# Patient Record
Sex: FEMALE | Race: WHITE | Employment: UNEMPLOYED | URBAN - METROPOLITAN AREA
[De-identification: names, ages, dates, MRNs, and addresses within clinical notes are randomized per-mention and may not be internally consistent; named-entity substitution may affect disease eponyms.]

---

## 2018-04-14 PROCEDURE — 86803 HEPATITIS C AB TEST: CPT | Performed by: FAMILY MEDICINE

## 2018-06-13 PROCEDURE — 88175 CYTOPATH C/V AUTO FLUID REDO: CPT | Performed by: OBSTETRICS & GYNECOLOGY

## 2018-06-13 PROCEDURE — 87624 HPV HI-RISK TYP POOLED RSLT: CPT | Performed by: OBSTETRICS & GYNECOLOGY

## 2019-03-22 PROBLEM — F41.9 ANXIETY: Status: ACTIVE | Noted: 2019-03-22

## 2019-03-22 PROBLEM — H93.13 TINNITUS OF BOTH EARS: Status: ACTIVE | Noted: 2019-03-22

## 2020-02-10 PROBLEM — S42.292A OTHER CLOSED DISPLACED FRACTURE OF PROXIMAL END OF LEFT HUMERUS, INITIAL ENCOUNTER: Status: ACTIVE | Noted: 2020-02-10

## 2020-02-11 ENCOUNTER — ANESTHESIA EVENT (OUTPATIENT)
Dept: SURGERY | Facility: HOSPITAL | Age: 55
End: 2020-02-11
Payer: COMMERCIAL

## 2020-02-11 ENCOUNTER — ANESTHESIA (OUTPATIENT)
Dept: SURGERY | Facility: HOSPITAL | Age: 55
End: 2020-02-11
Payer: COMMERCIAL

## 2020-02-11 ENCOUNTER — APPOINTMENT (OUTPATIENT)
Dept: GENERAL RADIOLOGY | Facility: HOSPITAL | Age: 55
End: 2020-02-11
Attending: ORTHOPAEDIC SURGERY
Payer: COMMERCIAL

## 2020-02-11 ENCOUNTER — HOSPITAL ENCOUNTER (OUTPATIENT)
Facility: HOSPITAL | Age: 55
Setting detail: HOSPITAL OUTPATIENT SURGERY
Discharge: HOME | End: 2020-02-11
Attending: ORTHOPAEDIC SURGERY | Admitting: ORTHOPAEDIC SURGERY
Payer: COMMERCIAL

## 2020-02-11 VITALS
HEART RATE: 97 BPM | SYSTOLIC BLOOD PRESSURE: 132 MMHG | OXYGEN SATURATION: 95 % | HEIGHT: 70 IN | WEIGHT: 190 LBS | BODY MASS INDEX: 27.2 KG/M2 | RESPIRATION RATE: 18 BRPM | DIASTOLIC BLOOD PRESSURE: 77 MMHG | TEMPERATURE: 97 F

## 2020-02-11 DIAGNOSIS — M25.512 ACUTE PAIN OF LEFT SHOULDER: ICD-10-CM

## 2020-02-11 PROCEDURE — 0RHK04Z INSERTION OF INTERNAL FIXATION DEVICE INTO LEFT SHOULDER JOINT, OPEN APPROACH: ICD-10-PCS | Performed by: ORTHOPAEDIC SURGERY

## 2020-02-11 PROCEDURE — 76000 FLUOROSCOPY <1 HR PHYS/QHP: CPT | Performed by: ORTHOPAEDIC SURGERY

## 2020-02-11 PROCEDURE — 64415 NJX AA&/STRD BRCH PLXS IMG: CPT | Performed by: ORTHOPAEDIC SURGERY

## 2020-02-11 PROCEDURE — 76942 ECHO GUIDE FOR BIOPSY: CPT | Performed by: ORTHOPAEDIC SURGERY

## 2020-02-11 PROCEDURE — 0LS20ZZ REPOSITION LEFT SHOULDER TENDON, OPEN APPROACH: ICD-10-PCS | Performed by: ORTHOPAEDIC SURGERY

## 2020-02-11 PROCEDURE — 99152 MOD SED SAME PHYS/QHP 5/>YRS: CPT | Performed by: ORTHOPAEDIC SURGERY

## 2020-02-11 PROCEDURE — 0PSD04Z REPOSITION LEFT HUMERAL HEAD WITH INTERNAL FIXATION DEVICE, OPEN APPROACH: ICD-10-PCS | Performed by: ORTHOPAEDIC SURGERY

## 2020-02-11 PROCEDURE — 3E0T3BZ INTRODUCTION OF ANESTHETIC AGENT INTO PERIPHERAL NERVES AND PLEXI, PERCUTANEOUS APPROACH: ICD-10-PCS | Performed by: ANESTHESIOLOGY

## 2020-02-11 DEVICE — SCREW CORT LOCK PURPLE 3.5X46: Type: IMPLANTABLE DEVICE | Status: FUNCTIONAL

## 2020-02-11 DEVICE — SCREW CORT LOCK PURPLE 3.5X38
Type: IMPLANTABLE DEVICE | Status: NON-FUNCTIONAL
Removed: 2020-06-08

## 2020-02-11 DEVICE — SCREW CORT LOCK PURPLE 3.5X28: Type: IMPLANTABLE DEVICE | Status: FUNCTIONAL

## 2020-02-11 DEVICE — SCREW CORT LOCK PURPLE 3.5X36: Type: IMPLANTABLE DEVICE | Status: FUNCTIONAL

## 2020-02-11 DEVICE — SCREW CORT LOCK PURPLE 3.5X26: Type: IMPLANTABLE DEVICE | Status: FUNCTIONAL

## 2020-02-11 DEVICE — SCREW CORT LOCK PURPLE 3.5X42: Type: IMPLANTABLE DEVICE | Status: FUNCTIONAL

## 2020-02-11 DEVICE — IMPLANTABLE DEVICE: Type: IMPLANTABLE DEVICE | Status: FUNCTIONAL

## 2020-02-11 DEVICE — SCREW CORT LOCK PURPLE 3.5X44
Type: IMPLANTABLE DEVICE | Status: NON-FUNCTIONAL
Removed: 2020-06-08

## 2020-02-11 DEVICE — SCREW CORT LOCK PURPLE 3.5X34: Type: IMPLANTABLE DEVICE | Status: FUNCTIONAL

## 2020-02-11 RX ORDER — NALOXONE HYDROCHLORIDE 0.4 MG/ML
80 INJECTION, SOLUTION INTRAMUSCULAR; INTRAVENOUS; SUBCUTANEOUS AS NEEDED
Status: DISCONTINUED | OUTPATIENT
Start: 2020-02-11 | End: 2020-02-11

## 2020-02-11 RX ORDER — MIDAZOLAM HYDROCHLORIDE 1 MG/ML
INJECTION INTRAMUSCULAR; INTRAVENOUS
Status: DISCONTINUED | OUTPATIENT
Start: 2020-02-11 | End: 2020-02-11

## 2020-02-11 RX ORDER — MORPHINE SULFATE 4 MG/ML
2 INJECTION, SOLUTION INTRAMUSCULAR; INTRAVENOUS EVERY 10 MIN PRN
Status: DISCONTINUED | OUTPATIENT
Start: 2020-02-11 | End: 2020-02-11

## 2020-02-11 RX ORDER — ONDANSETRON 2 MG/ML
4 INJECTION INTRAMUSCULAR; INTRAVENOUS EVERY 6 HOURS PRN
Status: DISCONTINUED | OUTPATIENT
Start: 2020-02-11 | End: 2020-02-11

## 2020-02-11 RX ORDER — HYDROCODONE BITARTRATE AND ACETAMINOPHEN 5; 325 MG/1; MG/1
1 TABLET ORAL AS NEEDED
Status: DISCONTINUED | OUTPATIENT
Start: 2020-02-11 | End: 2020-02-11

## 2020-02-11 RX ORDER — ACETAMINOPHEN 500 MG
1000 TABLET ORAL ONCE
Status: DISCONTINUED | OUTPATIENT
Start: 2020-02-11 | End: 2020-02-11 | Stop reason: HOSPADM

## 2020-02-11 RX ORDER — HYDROMORPHONE HYDROCHLORIDE 1 MG/ML
0.6 INJECTION, SOLUTION INTRAMUSCULAR; INTRAVENOUS; SUBCUTANEOUS EVERY 5 MIN PRN
Status: DISCONTINUED | OUTPATIENT
Start: 2020-02-11 | End: 2020-02-11

## 2020-02-11 RX ORDER — HYDROMORPHONE HYDROCHLORIDE 1 MG/ML
0.4 INJECTION, SOLUTION INTRAMUSCULAR; INTRAVENOUS; SUBCUTANEOUS EVERY 5 MIN PRN
Status: DISCONTINUED | OUTPATIENT
Start: 2020-02-11 | End: 2020-02-11

## 2020-02-11 RX ORDER — MORPHINE SULFATE 10 MG/ML
6 INJECTION, SOLUTION INTRAMUSCULAR; INTRAVENOUS EVERY 10 MIN PRN
Status: DISCONTINUED | OUTPATIENT
Start: 2020-02-11 | End: 2020-02-11

## 2020-02-11 RX ORDER — LIDOCAINE HYDROCHLORIDE 10 MG/ML
INJECTION, SOLUTION INFILTRATION; PERINEURAL
Status: COMPLETED | OUTPATIENT
Start: 2020-02-11 | End: 2020-02-11

## 2020-02-11 RX ORDER — SODIUM CHLORIDE, SODIUM LACTATE, POTASSIUM CHLORIDE, CALCIUM CHLORIDE 600; 310; 30; 20 MG/100ML; MG/100ML; MG/100ML; MG/100ML
INJECTION, SOLUTION INTRAVENOUS CONTINUOUS
Status: DISCONTINUED | OUTPATIENT
Start: 2020-02-11 | End: 2020-02-11

## 2020-02-11 RX ORDER — DEXAMETHASONE SODIUM PHOSPHATE 4 MG/ML
VIAL (ML) INJECTION AS NEEDED
Status: DISCONTINUED | OUTPATIENT
Start: 2020-02-11 | End: 2020-02-13 | Stop reason: SURG

## 2020-02-11 RX ORDER — CEFAZOLIN SODIUM/WATER 2 G/20 ML
2 SYRINGE (ML) INTRAVENOUS ONCE
Status: DISCONTINUED | OUTPATIENT
Start: 2020-02-11 | End: 2020-02-11 | Stop reason: HOSPADM

## 2020-02-11 RX ORDER — CEFAZOLIN SODIUM/WATER 2 G/20 ML
SYRINGE (ML) INTRAVENOUS AS NEEDED
Status: DISCONTINUED | OUTPATIENT
Start: 2020-02-11 | End: 2020-02-13 | Stop reason: SURG

## 2020-02-11 RX ORDER — HALOPERIDOL 5 MG/ML
0.25 INJECTION INTRAMUSCULAR ONCE AS NEEDED
Status: DISCONTINUED | OUTPATIENT
Start: 2020-02-11 | End: 2020-02-11

## 2020-02-11 RX ORDER — HYDROCODONE BITARTRATE AND ACETAMINOPHEN 5; 325 MG/1; MG/1
2 TABLET ORAL AS NEEDED
Status: DISCONTINUED | OUTPATIENT
Start: 2020-02-11 | End: 2020-02-11

## 2020-02-11 RX ORDER — DEXAMETHASONE SODIUM PHOSPHATE 10 MG/ML
INJECTION, SOLUTION INTRAMUSCULAR; INTRAVENOUS
Status: COMPLETED | OUTPATIENT
Start: 2020-02-11 | End: 2020-02-11

## 2020-02-11 RX ORDER — ONDANSETRON 2 MG/ML
4 INJECTION INTRAMUSCULAR; INTRAVENOUS ONCE AS NEEDED
Status: COMPLETED | OUTPATIENT
Start: 2020-02-11 | End: 2020-02-11

## 2020-02-11 RX ORDER — FAMOTIDINE 20 MG/1
20 TABLET ORAL ONCE
Status: DISCONTINUED | OUTPATIENT
Start: 2020-02-11 | End: 2020-02-11 | Stop reason: HOSPADM

## 2020-02-11 RX ORDER — LIDOCAINE HYDROCHLORIDE 10 MG/ML
INJECTION, SOLUTION EPIDURAL; INFILTRATION; INTRACAUDAL; PERINEURAL AS NEEDED
Status: DISCONTINUED | OUTPATIENT
Start: 2020-02-11 | End: 2020-02-13 | Stop reason: SURG

## 2020-02-11 RX ORDER — EPHEDRINE SULFATE 50 MG/ML
INJECTION, SOLUTION INTRAVENOUS AS NEEDED
Status: DISCONTINUED | OUTPATIENT
Start: 2020-02-11 | End: 2020-02-13 | Stop reason: SURG

## 2020-02-11 RX ORDER — HYDROCODONE BITARTRATE AND ACETAMINOPHEN 10; 325 MG/1; MG/1
1 TABLET ORAL EVERY 4 HOURS PRN
Qty: 20 TABLET | Refills: 0 | Status: SHIPPED | OUTPATIENT
Start: 2020-02-11 | End: 2020-06-08

## 2020-02-11 RX ORDER — LIDOCAINE HYDROCHLORIDE 10 MG/ML
INJECTION, SOLUTION INFILTRATION; PERINEURAL
Status: DISCONTINUED | OUTPATIENT
Start: 2020-02-11 | End: 2020-02-11

## 2020-02-11 RX ORDER — ROPIVACAINE HYDROCHLORIDE 5 MG/ML
INJECTION, SOLUTION EPIDURAL; INFILTRATION; PERINEURAL
Status: COMPLETED | OUTPATIENT
Start: 2020-02-11 | End: 2020-02-11

## 2020-02-11 RX ORDER — HYDROMORPHONE HYDROCHLORIDE 1 MG/ML
0.2 INJECTION, SOLUTION INTRAMUSCULAR; INTRAVENOUS; SUBCUTANEOUS EVERY 5 MIN PRN
Status: DISCONTINUED | OUTPATIENT
Start: 2020-02-11 | End: 2020-02-11

## 2020-02-11 RX ORDER — PROCHLORPERAZINE EDISYLATE 5 MG/ML
5 INJECTION INTRAMUSCULAR; INTRAVENOUS ONCE AS NEEDED
Status: COMPLETED | OUTPATIENT
Start: 2020-02-11 | End: 2020-02-11

## 2020-02-11 RX ORDER — METOCLOPRAMIDE 10 MG/1
10 TABLET ORAL ONCE
Status: DISCONTINUED | OUTPATIENT
Start: 2020-02-11 | End: 2020-02-11 | Stop reason: HOSPADM

## 2020-02-11 RX ORDER — PHENYLEPHRINE HCL 10 MG/ML
VIAL (ML) INJECTION AS NEEDED
Status: DISCONTINUED | OUTPATIENT
Start: 2020-02-11 | End: 2020-02-13 | Stop reason: SURG

## 2020-02-11 RX ORDER — ONDANSETRON 2 MG/ML
INJECTION INTRAMUSCULAR; INTRAVENOUS AS NEEDED
Status: DISCONTINUED | OUTPATIENT
Start: 2020-02-11 | End: 2020-02-13 | Stop reason: SURG

## 2020-02-11 RX ORDER — ROPIVACAINE HYDROCHLORIDE 5 MG/ML
INJECTION, SOLUTION EPIDURAL; INFILTRATION; PERINEURAL
Status: DISCONTINUED | OUTPATIENT
Start: 2020-02-11 | End: 2020-02-11

## 2020-02-11 RX ORDER — MORPHINE SULFATE 4 MG/ML
4 INJECTION, SOLUTION INTRAMUSCULAR; INTRAVENOUS EVERY 10 MIN PRN
Status: DISCONTINUED | OUTPATIENT
Start: 2020-02-11 | End: 2020-02-11

## 2020-02-11 RX ADMIN — SODIUM CHLORIDE, SODIUM LACTATE, POTASSIUM CHLORIDE, CALCIUM CHLORIDE: 600; 310; 30; 20 INJECTION, SOLUTION INTRAVENOUS at 16:50:00

## 2020-02-11 RX ADMIN — EPHEDRINE SULFATE 5 MG: 50 INJECTION, SOLUTION INTRAVENOUS at 14:58:00

## 2020-02-11 RX ADMIN — DEXAMETHASONE SODIUM PHOSPHATE 4 MG: 10 INJECTION, SOLUTION INTRAMUSCULAR; INTRAVENOUS at 14:02:00

## 2020-02-11 RX ADMIN — CEFAZOLIN SODIUM/WATER 2 G: 2 G/20 ML SYRINGE (ML) INTRAVENOUS at 14:34:00

## 2020-02-11 RX ADMIN — PHENYLEPHRINE HCL 100 MCG: 10 MG/ML VIAL (ML) INJECTION at 15:54:00

## 2020-02-11 RX ADMIN — PHENYLEPHRINE HCL 100 MCG: 10 MG/ML VIAL (ML) INJECTION at 16:11:00

## 2020-02-11 RX ADMIN — EPHEDRINE SULFATE 5 MG: 50 INJECTION, SOLUTION INTRAVENOUS at 14:54:00

## 2020-02-11 RX ADMIN — PHENYLEPHRINE HCL 100 MCG: 10 MG/ML VIAL (ML) INJECTION at 16:02:00

## 2020-02-11 RX ADMIN — PHENYLEPHRINE HCL 100 MCG: 10 MG/ML VIAL (ML) INJECTION at 16:31:00

## 2020-02-11 RX ADMIN — SODIUM CHLORIDE, SODIUM LACTATE, POTASSIUM CHLORIDE, CALCIUM CHLORIDE: 600; 310; 30; 20 INJECTION, SOLUTION INTRAVENOUS at 15:09:00

## 2020-02-11 RX ADMIN — LIDOCAINE HYDROCHLORIDE 5 ML: 10 INJECTION, SOLUTION INFILTRATION; PERINEURAL at 14:02:00

## 2020-02-11 RX ADMIN — PHENYLEPHRINE HCL 100 MCG: 10 MG/ML VIAL (ML) INJECTION at 15:45:00

## 2020-02-11 RX ADMIN — ONDANSETRON 4 MG: 2 INJECTION INTRAMUSCULAR; INTRAVENOUS at 16:31:00

## 2020-02-11 RX ADMIN — PHENYLEPHRINE HCL 100 MCG: 10 MG/ML VIAL (ML) INJECTION at 16:23:00

## 2020-02-11 RX ADMIN — PHENYLEPHRINE HCL 100 MCG: 10 MG/ML VIAL (ML) INJECTION at 16:07:00

## 2020-02-11 RX ADMIN — EPHEDRINE SULFATE 5 MG: 50 INJECTION, SOLUTION INTRAVENOUS at 14:56:00

## 2020-02-11 RX ADMIN — DEXAMETHASONE SODIUM PHOSPHATE 4 MG: 4 MG/ML VIAL (ML) INJECTION at 14:54:00

## 2020-02-11 RX ADMIN — ROPIVACAINE HYDROCHLORIDE 30 ML: 5 INJECTION, SOLUTION EPIDURAL; INFILTRATION; PERINEURAL at 14:02:00

## 2020-02-11 RX ADMIN — LIDOCAINE HYDROCHLORIDE 25 MG: 10 INJECTION, SOLUTION EPIDURAL; INFILTRATION; INTRACAUDAL; PERINEURAL at 14:30:00

## 2020-02-11 NOTE — BRIEF OP NOTE
Pre-Operative Diagnosis: Acute pain of left shoulder [M25.512], left proximal humerus fracture     Post-Operative Diagnosis: same     Procedure Performed:   Procedure(s):  LEFT PROXIMAL HUMERUS OPEN REDUCTION INTERNAL FIXATION AND BICEPS TENODESIS    Surge

## 2020-02-11 NOTE — OPERATIVE REPORT
[facility]    PATIENT'S NAME: Lea Singh   ATTENDING PHYSICIAN: Kinga Curran MD   OPERATING PHYSICIAN: Kinga Curran MD   PATIENT ACCOUNT#:   [id number]    LOCATION:  [place] [address]  MEDICAL RECORD #:   [id number] 02/22/2020, the patient was seen and evaluated preoperatively. Her left humerus was identified as the correct operative site. My initials were placed. She was transferred to the operating room and transferred to the operating table in the supine.   Gener the tuberosities. The patient had a [employer] proximal humeral locking plate placed laterally. Its position was confirmed on fluoroscopy with the anterior, posterior, and lateral x-ray views.   It was lagged to the bone to further compress the fracture, and

## 2020-02-11 NOTE — OR PREOP
Patient very [place] with hearing aids. Patient has not seen surgeon or anesthesia. Patient left hearing aids in and has case.  Spoke with frieda in [place] regarding this and she verbalized understanding and she will give patients  hearing aids before janel

## 2020-02-11 NOTE — ANESTHESIA PROCEDURE NOTES
Airway  Urgency: Elective      General Information and Staff    Patient location during procedure: OR  Anesthesiologist: Vamsi Garcia MD  Performed: anesthesiologist     Indications and Patient Condition  Indications for airway management: anesthesia

## 2020-02-11 NOTE — ANESTHESIA PROCEDURE NOTES
Peripheral Block  Date/Time: 2/11/2020 2:02 PM  Performed by: Arielle Balbuena MD  Authorized by: Arielle Balbuena MD       General Information and Staff    Start Time:  2/11/2020 2:02 PM  End Time:  2/11/2020 2:10 PM  Anesthesiologist:  MD SARATH Lowry

## 2020-02-11 NOTE — ANESTHESIA POSTPROCEDURE EVALUATION
Patient: Belinda Pedersen    Procedure Summary     Date:  02/11/20 Room / Location:  [facility] OR  / [facility] OR    Anesthesia Start:   Anesthesia Stop:      Procedure:  HUMERUS OPEN REDUCTION INTERNAL FIXATION/ PINNING (Left ) Diagnosis:       Acute pain of

## 2020-02-11 NOTE — ANESTHESIA PROCEDURE NOTES
Peripheral Block  Date/Time: 2/11/2020 2:02 PM  Performed by: Chino Oneal MD  Authorized by: Chino Oneal MD       General Information and Staff    Start Time:  2/11/2020 2:02 PM  End Time:  2/11/2020 2:10 PM  Anesthesiologist:  Jade Ordonez

## 2020-02-11 NOTE — ANESTHESIA PREPROCEDURE EVALUATION
Anesthesia PreOp Note    HPI:     Neli Ruggiero is a 62year old female who presents for preoperative consultation requested by: Charlene Harding MD    Date of Surgery: 2/11/2020    Procedure(s):  HUMERUS OPEN REDUCTION INTERNAL FIXATION/ PINNING  Indicat 0900  Amitriptyline HCl 25 MG Oral Tab, Take 1 tablet (25 mg total) by mouth nightly., Disp: 90 tablet, Rfl: 1, 2/11/2020 at 0900  [DISCONTINUED] HYDROcodone-acetaminophen  MG Oral Tab, Take 1 tablet by mouth every 6 (six) hours as needed for Pain. , Substance and Sexual Activity      Alcohol use: Yes        Comment: Drinks socially      Drug use: No      Sexual activity: Yes        Partners: Male        Birth control/protection: Vasectomy    Lifestyle      Physical activity:        Days per week: Not (5' 10\")         Anesthesia Evaluation     Patient summary reviewed and Nursing notes reviewed    Airway   Mallampati: II  TM distance: >3 FB  Neck ROM: full  Dental      Pulmonary - negative ROS and normal exam    breath sounds clear to auscultation  Car

## 2020-02-11 NOTE — H&P
Rosendo Bingham  2/19/1965  62year old   female  Fawn Daniel MD     HPI:   Patient presents with:  Shoulder Pain: Left shoulder fx after patient slipped and fell on ice on 2/6.  Went to [facility] and got xrays, Norco, and sling.         The patient is right-h tobacco: Never Used    Alcohol use: Yes    Drug use:  No            REVIEW OF SYSTEMS:   A 12 point review of systems was performed as documented on the intake form and reviewed by me today with pertinent positives and negatives listed in the HPI.     EXAM: additional surgery as well as anesthetic complications including death. Risks of fracture care include malunion, nonunion, delayed union, and post-traumatic arthritis.   The patient consented to the procedure.     All of their questions were answered and th

## 2020-02-11 NOTE — OR PREOP
Dr. Veronica Mercedes notified patient ate and drank last at 0830. Patient was an add on case today. No new orders at this time.

## 2020-02-25 PROBLEM — Z47.89 AFTERCARE FOLLOWING SURGERY OF THE MUSCULOSKELETAL SYSTEM: Status: ACTIVE | Noted: 2020-02-25

## 2020-03-03 PROBLEM — M25.512 ACUTE PAIN OF LEFT SHOULDER: Status: ACTIVE | Noted: 2020-03-03

## 2020-04-23 PROBLEM — M25.612 POST-TRAUMATIC STIFFNESS OF LEFT SHOULDER JOINT: Status: ACTIVE | Noted: 2020-04-23

## (undated) DEVICE — GAMMEX® NON-LATEX PI ORTHO SIZE 7, STERILE POLYISOPRENE POWDER-FREE SURGICAL GLOVE: Brand: GAMMEX

## (undated) DEVICE — STERILE TETRA-FLEX CF, ELASTIC BANDAGE LATEX FREE 6IN X5.5 YD: Brand: TETRA-FLEX™CF

## (undated) DEVICE — SUCTION CANISTER, 3000CC,SAFELINER: Brand: DEROYAL

## (undated) DEVICE — CONMED ACCESSORY ELECTRODE, NEEDLE ELECTRODE

## (undated) DEVICE — STANDARD HYPODERMIC NEEDLE,POLYPROPYLENE HUB: Brand: MONOJECT

## (undated) DEVICE — SUTURE ETHIBOND 1 CT-1

## (undated) DEVICE — SUTURE VICRYL 0 CP-1

## (undated) DEVICE — 3M™ STERI-STRIP™ REINFORCED ADHESIVE SKIN CLOSURES, R1547, 1/2 IN X 4 IN (12 MM X 100 MM), 6 STRIPS/ENVELOPE: Brand: 3M™ STERI-STRIP™

## (undated) DEVICE — SCREW CORT LOCK PURPLE 3.5X30
Type: IMPLANTABLE DEVICE | Status: NON-FUNCTIONAL
Removed: 2020-02-11

## (undated) DEVICE — SUTURE ETHILON 3-0 669H

## (undated) DEVICE — COTTON UNDERCAST PADDING,REGULAR FINISH: Brand: WEBRIL

## (undated) DEVICE — ABDOMINAL PAD: Brand: CURITY

## (undated) DEVICE — 3M™ STERI-STRIP™ COMPOUND BENZOIN TINCTURE 40 BAGS/CARTON 4 CARTONS/CASE C1544: Brand: 3M™ STERI-STRIP™

## (undated) DEVICE — GAMMEX® PI HYBRID SIZE 6.5, STERILE POWDER-FREE SURGICAL GLOVE, POLYISOPRENE AND NEOPRENE BLEND: Brand: GAMMEX

## (undated) DEVICE — STERILE TETRA-FLEX CF, ELASTIC BANDAGEE, 3" X 5.5YD: Brand: TETRA-FLEX™CF

## (undated) DEVICE — HEWSON SUTURE RETRIEVER: Brand: HEWSON SUTURE RETRIEVER

## (undated) DEVICE — SHOULDER: Brand: MEDLINE INDUSTRIES, INC.

## (undated) DEVICE — SUTURE VICRYL 2-0 FS-1

## (undated) DEVICE — K WIRE

## (undated) DEVICE — WEBRIL COTTON UNDERCAST PADDING: Brand: WEBRIL

## (undated) DEVICE — SUTURE FIBERWIRE S AR-7200

## (undated) DEVICE — BATTERY

## (undated) DEVICE — SOL  .9 1000ML BTL

## (undated) DEVICE — PRECISION (7.0 X 0.51 X 29.5MM)

## (undated) DEVICE — 3M™ MEDITPORE™ SOFT CLOTH TAPE 6 IN X 10 YD 12 ROLLS/CASE 2966: Brand: 3M™ MEDIPORE™

## (undated) DEVICE — STERILE TETRA-FLEX CF, ELASTIC BANDAGE, 4" X 5.5YD: Brand: TETRA-FLEX™CF

## (undated) DEVICE — SPONGE LAP 18X18 XRAY STRL

## (undated) DEVICE — FIBERWIRE 2.0 NEEDLE AR-7223

## (undated) DEVICE — SUTURE PROLENE 3-0 8687H

## (undated) DEVICE — DRAPE C-ARM UNIVERSAL

## (undated) DEVICE — 12 ML SYRINGE LUER-LOCK TIP: Brand: MONOJECT

## (undated) DEVICE — INTENT TO BE USED WITH SUTURE MATERIAL FOR TISSUE CLOSURE: Brand: RICHARD-ALLAN® NEEDLE 3/8 CIRCLE TROCAR

## (undated) DEVICE — GAUZE SPONGES,12 PLY: Brand: CURITY

## (undated) DEVICE — ANGIO CATH 16GX5-1/4

## (undated) DEVICE — DRILL BIT 2.7MM 2142-27-070